# Patient Record
Sex: MALE
[De-identification: names, ages, dates, MRNs, and addresses within clinical notes are randomized per-mention and may not be internally consistent; named-entity substitution may affect disease eponyms.]

---

## 2019-01-10 NOTE — OP
PROCEDURE DATE:  01/10/2019



PREOPERATIVE DIAGNOSIS:  Elevated prostate-specific antigen 4.2.



POSTOPERATIVE DIAGNOSIS:  Elevated prostate-specific antigen 4.2.



PROCEDURE:  Transrectal ultrasound diagnostic, transrectal ultrasound

guidance, and transrectal prostate biopsies.



SURGEON:  Chan Coronado MD



DESCRIPTION OF PROCEDURE:  The patient confirmed in the holding area that

he has not taken any NSAIDs for over a week.  His serum calcium was 12.1,

but his EKG was good and he is under the care of an endocrinologist.  After

discussion with the head of anesthesia, we felt it was perfectly safe to

proceed with the sedation for the procedure.  The patient was brought to

the operating room.  He was given intravenous gentamicin 160 mg and

Rocephin 1 g IV piggyback.  His rectum was prepped with lidocaine jelly and

also Betadine.  We now proceeded with transrectal ultrasound for diagnostic

purposes.  The transducer was introduced into the rectum and a total volume

of 56.2 cm3 was obtained.  There were no localizing lesions noted.  We then

used the ultrasound as a guide to perform biopsies.  Two biopsies in each

of six sextants were obtained and sent to pathology.  The transducer was

left in place for 5 minutes to promote hemostasis, and upon removal, there

was virtually no bleeding noted.  Estimated blood loss 5 mL.  The patient

tolerated the procedure well.  He was told in the holding area prior to the

procedure that he should take it easy for 1 or 2 days and that in the

unlikely event he develops high fever or chills, to call me immediately,

and if no immediate response, go right to the emergency room.





__________________________________________

Chan Coronado MD





DD:  01/10/2019 11:17:19

DT:  01/10/2019 11:20:43

Job # [id number]

## 2019-02-02 ENCOUNTER — HOSPITAL ENCOUNTER (OUTPATIENT)
Dept: HOSPITAL 31 - C.NUCMED | Age: 59
End: 2019-02-02
Payer: COMMERCIAL

## 2019-02-02 DIAGNOSIS — E55.9: ICD-10-CM

## 2019-02-02 DIAGNOSIS — E23.1: Primary | ICD-10-CM

## 2019-02-05 ENCOUNTER — HOSPITAL ENCOUNTER (OUTPATIENT)
Dept: HOSPITAL 31 - C.USIC | Age: 59
End: 2019-02-05
Payer: COMMERCIAL

## 2019-02-05 DIAGNOSIS — M81.0: ICD-10-CM

## 2019-02-05 DIAGNOSIS — E23.1: Primary | ICD-10-CM

## 2019-02-18 ENCOUNTER — HOSPITAL ENCOUNTER (OUTPATIENT)
Dept: HOSPITAL 31 - C.MRIC | Age: 59
End: 2019-02-18
Payer: COMMERCIAL

## 2019-02-18 DIAGNOSIS — N20.0: Primary | ICD-10-CM

## 2019-03-05 ENCOUNTER — HOSPITAL ENCOUNTER (OUTPATIENT)
Dept: HOSPITAL 31 - C.LAB | Age: 59
End: 2019-03-05
Payer: COMMERCIAL

## 2019-03-05 DIAGNOSIS — E23.1: Primary | ICD-10-CM
